# Patient Record
Sex: MALE | Race: WHITE
[De-identification: names, ages, dates, MRNs, and addresses within clinical notes are randomized per-mention and may not be internally consistent; named-entity substitution may affect disease eponyms.]

---

## 2021-08-15 ENCOUNTER — HOSPITAL ENCOUNTER (INPATIENT)
Dept: HOSPITAL 47 - EC | Age: 51
LOS: 1 days | Discharge: HOME | DRG: 177 | End: 2021-08-16
Attending: HOSPITALIST | Admitting: HOSPITALIST
Payer: COMMERCIAL

## 2021-08-15 VITALS — HEART RATE: 88 BPM

## 2021-08-15 DIAGNOSIS — E78.5: ICD-10-CM

## 2021-08-15 DIAGNOSIS — E11.9: ICD-10-CM

## 2021-08-15 DIAGNOSIS — Z86.711: ICD-10-CM

## 2021-08-15 DIAGNOSIS — J96.01: ICD-10-CM

## 2021-08-15 DIAGNOSIS — M48.00: ICD-10-CM

## 2021-08-15 DIAGNOSIS — U07.1: Primary | ICD-10-CM

## 2021-08-15 DIAGNOSIS — G47.33: ICD-10-CM

## 2021-08-15 DIAGNOSIS — Z79.84: ICD-10-CM

## 2021-08-15 DIAGNOSIS — Z79.899: ICD-10-CM

## 2021-08-15 DIAGNOSIS — I48.91: ICD-10-CM

## 2021-08-15 DIAGNOSIS — J12.82: ICD-10-CM

## 2021-08-15 DIAGNOSIS — M21.372: ICD-10-CM

## 2021-08-15 DIAGNOSIS — E66.01: ICD-10-CM

## 2021-08-15 DIAGNOSIS — Z79.01: ICD-10-CM

## 2021-08-15 DIAGNOSIS — Z79.890: ICD-10-CM

## 2021-08-15 DIAGNOSIS — I10: ICD-10-CM

## 2021-08-15 LAB
ALBUMIN SERPL-MCNC: 3.7 G/DL (ref 3.5–5)
ALBUMIN SERPL-MCNC: 4 G/DL (ref 3.5–5)
ALP SERPL-CCNC: 62 U/L (ref 38–126)
ALP SERPL-CCNC: 71 U/L (ref 38–126)
ALT SERPL-CCNC: 47 U/L (ref 4–49)
ALT SERPL-CCNC: 51 U/L (ref 4–49)
ANION GAP SERPL CALC-SCNC: 11 MMOL/L
ANION GAP SERPL CALC-SCNC: 12 MMOL/L
APTT BLD: 26.9 SEC (ref 22–30)
AST SERPL-CCNC: 70 U/L (ref 17–59)
AST SERPL-CCNC: 79 U/L (ref 17–59)
BASOPHILS # BLD AUTO: 0 K/UL (ref 0–0.2)
BASOPHILS NFR BLD AUTO: 1 %
BUN SERPL-SCNC: 20 MG/DL (ref 9–20)
BUN SERPL-SCNC: 21 MG/DL (ref 9–20)
CALCIUM SPEC-MCNC: 8.8 MG/DL (ref 8.4–10.2)
CALCIUM SPEC-MCNC: 9 MG/DL (ref 8.4–10.2)
CHLORIDE SERPL-SCNC: 97 MMOL/L (ref 98–107)
CHLORIDE SERPL-SCNC: 97 MMOL/L (ref 98–107)
CO2 SERPL-SCNC: 24 MMOL/L (ref 22–30)
CO2 SERPL-SCNC: 26 MMOL/L (ref 22–30)
EOSINOPHIL # BLD AUTO: 0 K/UL (ref 0–0.7)
EOSINOPHIL NFR BLD AUTO: 1 %
ERYTHROCYTE [DISTWIDTH] IN BLOOD BY AUTOMATED COUNT: 5.27 M/UL (ref 4.3–5.9)
ERYTHROCYTE [DISTWIDTH] IN BLOOD: 14 % (ref 11.5–15.5)
FERRITIN SERPL-MCNC: 1504.2 NG/ML (ref 22–322)
GLUCOSE BLD-MCNC: 111 MG/DL (ref 75–99)
GLUCOSE BLD-MCNC: 114 MG/DL (ref 75–99)
GLUCOSE BLD-MCNC: 120 MG/DL (ref 75–99)
GLUCOSE BLD-MCNC: 182 MG/DL (ref 75–99)
GLUCOSE SERPL-MCNC: 103 MG/DL (ref 74–99)
GLUCOSE SERPL-MCNC: 106 MG/DL (ref 74–99)
HBA1C MFR BLD: 6.5 % (ref 4–6)
HCT VFR BLD AUTO: 43.2 % (ref 39–53)
HGB BLD-MCNC: 15.5 GM/DL (ref 13–17.5)
INR PPP: 1.1 (ref ?–1.2)
LDH SPEC-CCNC: 971 U/L (ref 313–618)
LYMPHOCYTES # SPEC AUTO: 0.6 K/UL (ref 1–4.8)
LYMPHOCYTES NFR SPEC AUTO: 12 %
MAGNESIUM SPEC-SCNC: 2.2 MG/DL (ref 1.6–2.3)
MCH RBC QN AUTO: 29.4 PG (ref 25–35)
MCHC RBC AUTO-ENTMCNC: 35.8 G/DL (ref 31–37)
MCV RBC AUTO: 82 FL (ref 80–100)
MONOCYTES # BLD AUTO: 0.3 K/UL (ref 0–1)
MONOCYTES NFR BLD AUTO: 5 %
NEUTROPHILS # BLD AUTO: 4.1 K/UL (ref 1.3–7.7)
NEUTROPHILS NFR BLD AUTO: 79 %
PLATELET # BLD AUTO: 200 K/UL (ref 150–450)
POTASSIUM SERPL-SCNC: 3.5 MMOL/L (ref 3.5–5.1)
POTASSIUM SERPL-SCNC: 3.7 MMOL/L (ref 3.5–5.1)
PROT SERPL-MCNC: 6.7 G/DL (ref 6.3–8.2)
PROT SERPL-MCNC: 7.2 G/DL (ref 6.3–8.2)
PT BLD: 11.2 SEC (ref 9–12)
SODIUM SERPL-SCNC: 133 MMOL/L (ref 137–145)
SODIUM SERPL-SCNC: 134 MMOL/L (ref 137–145)
WBC # BLD AUTO: 5.1 K/UL (ref 3.8–10.6)

## 2021-08-15 PROCEDURE — 83880 ASSAY OF NATRIURETIC PEPTIDE: CPT

## 2021-08-15 PROCEDURE — 84484 ASSAY OF TROPONIN QUANT: CPT

## 2021-08-15 PROCEDURE — 83036 HEMOGLOBIN GLYCOSYLATED A1C: CPT

## 2021-08-15 PROCEDURE — 3E0F7SF INTRODUCTION OF OTHER GAS INTO RESPIRATORY TRACT, VIA NATURAL OR ARTIFICIAL OPENING: ICD-10-PCS

## 2021-08-15 PROCEDURE — 82728 ASSAY OF FERRITIN: CPT

## 2021-08-15 PROCEDURE — 85730 THROMBOPLASTIN TIME PARTIAL: CPT

## 2021-08-15 PROCEDURE — 86140 C-REACTIVE PROTEIN: CPT

## 2021-08-15 PROCEDURE — 93005 ELECTROCARDIOGRAM TRACING: CPT

## 2021-08-15 PROCEDURE — 83735 ASSAY OF MAGNESIUM: CPT

## 2021-08-15 PROCEDURE — 83615 LACTATE (LD) (LDH) ENZYME: CPT

## 2021-08-15 PROCEDURE — 85610 PROTHROMBIN TIME: CPT

## 2021-08-15 PROCEDURE — 80053 COMPREHEN METABOLIC PANEL: CPT

## 2021-08-15 PROCEDURE — 85025 COMPLETE CBC W/AUTO DIFF WBC: CPT

## 2021-08-15 PROCEDURE — 85379 FIBRIN DEGRADATION QUANT: CPT

## 2021-08-15 PROCEDURE — 84145 PROCALCITONIN (PCT): CPT

## 2021-08-15 PROCEDURE — 36415 COLL VENOUS BLD VENIPUNCTURE: CPT

## 2021-08-15 PROCEDURE — 71045 X-RAY EXAM CHEST 1 VIEW: CPT

## 2021-08-15 PROCEDURE — 83605 ASSAY OF LACTIC ACID: CPT

## 2021-08-15 RX ADMIN — INSULIN ASPART SCH: 100 INJECTION, SOLUTION INTRAVENOUS; SUBCUTANEOUS at 09:07

## 2021-08-15 RX ADMIN — INSULIN ASPART SCH: 100 INJECTION, SOLUTION INTRAVENOUS; SUBCUTANEOUS at 17:22

## 2021-08-15 RX ADMIN — LEVOTHYROXINE SODIUM SCH MCG: 0.11 TABLET ORAL at 09:28

## 2021-08-15 RX ADMIN — INSULIN ASPART SCH: 100 INJECTION, SOLUTION INTRAVENOUS; SUBCUTANEOUS at 12:34

## 2021-08-15 RX ADMIN — BUDESONIDE AND FORMOTEROL FUMARATE DIHYDRATE SCH: 160; 4.5 AEROSOL RESPIRATORY (INHALATION) at 07:59

## 2021-08-15 RX ADMIN — Medication SCH MG: at 09:12

## 2021-08-15 RX ADMIN — PANTOPRAZOLE SODIUM SCH MG: 40 TABLET, DELAYED RELEASE ORAL at 09:12

## 2021-08-15 RX ADMIN — BISOPROLOL FUMARATE SCH MG: 5 TABLET ORAL at 09:12

## 2021-08-15 RX ADMIN — OXYCODONE HYDROCHLORIDE AND ACETAMINOPHEN SCH MG: 500 TABLET ORAL at 09:12

## 2021-08-15 RX ADMIN — ATORVASTATIN CALCIUM SCH MG: 20 TABLET, FILM COATED ORAL at 09:12

## 2021-08-15 RX ADMIN — BUDESONIDE AND FORMOTEROL FUMARATE DIHYDRATE SCH PUFF: 160; 4.5 AEROSOL RESPIRATORY (INHALATION) at 20:01

## 2021-08-15 RX ADMIN — Medication SCH MCG: at 09:12

## 2021-08-15 RX ADMIN — INSULIN ASPART SCH UNIT: 100 INJECTION, SOLUTION INTRAVENOUS; SUBCUTANEOUS at 20:29

## 2021-08-15 RX ADMIN — APIXABAN SCH MG: 5 TABLET, FILM COATED ORAL at 09:12

## 2021-08-15 RX ADMIN — DOXAZOSIN MESYLATE SCH MG: 4 TABLET ORAL at 20:29

## 2021-08-15 RX ADMIN — APIXABAN SCH MG: 5 TABLET, FILM COATED ORAL at 20:29

## 2021-08-15 RX ADMIN — DOXAZOSIN MESYLATE SCH MG: 4 TABLET ORAL at 09:12

## 2021-08-15 NOTE — P.PN
Progress Note - Text


Progress Note Date: 08/15/21





I reviewed patient's labs from this morning.  There is a slight increase in 

creatinine.  We'll hold his ACE inhibitor, Lasix and spironolactone.  We'll 

monitor patient closely for volume overload.  Patient currently satting well on 

2 L nasal cannula.  Pulmonology patient is out of the window for remdesivir.

## 2021-08-15 NOTE — P.CNPUL
History of Present Illness


Consult date: 08/15/21


Requesting physician: Anthony Dwyer


Reason for consult: dyspnea, cough, hypoxemia, pneumonia, abnormal CXR/CT


Chief complaint: COVID-19 pneumonia


History of present illness: 


 This is a 51-year-old male patient with past medical history of diabetes 

mellitus type 2, hypertension, history of bilateral PEs on Eliquis, presented to

the emergency department on 08/15/2021 for evaluation of shortness of breath, 

palpitations, fever, body aches, intermittent diarrhea and mild nausea.  Patient

was diagnosed with COVID 19.  The onset of symptoms was 12 days ago.  His son 

was diagnosed with COVID 19.  Patient states his symptoms were progressively 

worse and not better.  He has been battling a fever over the past 3-4 days.  He 

feels more short of breath with exertion.  He states he feels like all he wants 

to do is lay in bed.  Patient lives in [facility], but he is a  citizen, 

and owns a vacation property in [place], where he was vacationing.  

Patient did not receive COVID-19 vaccine.  On arrival to the emergency 

department his O2 sat is 92-94%.  His chest x-ray shows some mild pulmonary 

interstitial infiltrates.  Does have history of obstructive sleep apnea for 

which he wears a CPAP.  His lab work showed normal white count of 5.1, 

hemoglobin of 15.5, lymphocyte count of 0.6, d-dimer was 0.76, sodium is 133, 

potassium is 3.7, chloride is 97, CO2 is 24, B1 is 20, creatinine 0.78, lactic 

acid was 1.2, AST was 79, ALT was 51, alkaline phosphatase was 71, troponin was 

0.018, CRP was 4.1, proBNP was 284.  Patient was started on Decadron 6 mg daily,

vitamins C, D, and zinc.  He is already on maintenance dose Eliquis 5 mg daily, 

he is on Ventolin and Symbicort, and he is receiving IV fluids at 75 ML per 

hour.  Have a low-grade fever this morning with a temp of 100.5F, blood 

pressure stable, he is currently on his home CPAP, when he is on nasal cannula 

he is requiring 2 L of oxygen and his pulse ox is 94%.  He denies any chest 

discomfort. 








Review of Systems


All systems: negative


Constitutional: Reports fever, Denies chills


Eyes: denies blurred vision, denies pain


Ears, nose, mouth and throat: Denies headache, Denies sore throat


Cardiovascular: Denies chest pain, Denies shortness of breath


Respiratory: Reports dyspnea, Denies cough


Gastrointestinal: Reports nausea, Denies abdominal pain, Denies diarrhea, Denies

vomiting


Musculoskeletal: Denies myalgias


Integumentary: Denies pruritus, Denies rash


Neurological: Denies numbness, Denies weakness


Psychiatric: Denies anxiety, Denies depression


Endocrine: Denies fatigue, Denies weight change





Past Medical History


Past Medical History: Diabetes Mellitus, Hypertension, Pneumonia


Additional Past Medical History / Comment(s): emboli


History of Any Multi-Drug Resistant Organisms: None Reported


Past Surgical History: Orthopedic Surgery


Past Psychological History: No Psychological Hx Reported


Smoking Status: Never smoker


Past Alcohol Use History: Occasional


Past Drug Use History: None Reported





- Past Family History


  ** Father


Family Medical History: Hyperlipidemia





Medications and Allergies


                                Home Medications











 Medication  Instructions  Recorded  Confirmed  Type


 


Albuterol Sulfate [Ventolin HFA] 2 puff INHALATION RT-Q4H PRN 08/15/21 08/15/21 

History


 


Apixaban [Eliquis] 5 mg PO BID 08/15/21 08/15/21 History


 


Bisoprolol Fumarate [Zebeta] 10 mg PO DAILY 08/15/21 08/15/21 History


 


Candesartan Cilexetil [Atacand] 32 mg PO DAILY 08/15/21 08/15/21 History


 


Empagliflozin/Metformin HCl 1 tab PO BID 08/15/21 08/15/21 History





[Synjardy Xr 25-1,000 mg Tablet]    


 


Fluticasone/Vilanterol [Breo 1 puff INHALATION RT-DAILY 08/15/21 08/15/21 

History





Ellipta 200-25 Mcg INH]    


 


Furosemide [Lasix] 120 mg PO DAILY 08/15/21 08/15/21 History


 


Levothyroxine Sodium [Synthroid] 112 mcg PO DAILY 08/15/21 08/15/21 History


 


RABEprazole SODIUM 20 mg PO DAILY 08/15/21 08/15/21 History


 


Rosuvastatin [Crestor] 10 mg PO DAILY 08/15/21 08/15/21 History


 


Spironolactone 25 mg PO DAILY 08/15/21 08/15/21 History


 


Terazosin HCl 10 mg PO BID 08/15/21 08/15/21 History








                                    Allergies











Allergy/AdvReac Type Severity Reaction Status Date / Time


 


No Known Allergies Allergy   Verified 08/15/21 07:27














Physical Exam


Vitals: 


                                   Vital Signs











  Temp Pulse Resp BP Pulse Ox


 


 08/15/21 09:00  98.0 F  90  18  121/81  96


 


 08/15/21 06:25  99 F  88  20  106/65  95


 


 08/15/21 04:17  100.5 F H  90  20  107/80  94 L


 


 08/15/21 03:22   88  20  110/78  98


 


 08/15/21 01:21  97.7 F  102 H  22  121/82  94 L








                                Intake and Output











 08/14/21 08/15/21 08/15/21





 22:59 06:59 14:59


 


Other:   


 


  Weight  145.15 kg 











 GENERAL EXAM: Alert, very pleasant, 51-year-old white male, on CPAP, 

comfortable in no apparent distress.


HEAD: Normocephalic/atraumatic.


EYES: Normal reaction of pupils, equal size.  Conjunctiva pink, sclera white.


NOSE: Clear with pink turbinates.


THROAT: No erythema or exudates.


NECK: No masses, no JVD, no thyroid enlargement, no adenopathy.


CHEST: No chest wall deformity.  Symmetrical expansion. 


LUNGS: Equal air entry with no crackles, wheeze, rhonchi or dullness.


CVS: Regular rate and rhythm, normal S1 and S2, no gallops, no murmurs, no rubs


ABDOMEN: Soft, nontender.  No hepatosplenomegaly, normal bowel sounds, no 

guarding or rigidity.


EXTREMITIES: No clubbing, no edema, no cyanosis, 2+ pulses and upper and lower 

extremities.


MUSCULOSKELETAL: Muscle strength and tone normal.


SPINE: No scoliosis or deformity


SKIN: No rashes


CENTRAL NERVOUS SYSTEM: Alert and oriented -3.  No focal deficits, tone is 

normal in all 4 extremities.


PSYCHIATRIC: Alert and oriented -3.  Appropriate affect.  Intact judgment and 

insight.











Results





- Laboratory Findings


CBC and BMP: 


                                 08/15/21 01:50





                                 08/15/21 05:50


PT/INR, D-dimer











PT  11.2 sec (9.0-12.0)   08/15/21  01:50    


 


INR  1.1  (<1.2)   08/15/21  01:50    


 


D-Dimer  0.76 mg/L FEU (<0.60)  H  08/15/21  09:06    








Abnormal lab findings: 


                                  Abnormal Labs











  08/15/21 08/15/21 08/15/21





  01:50 01:50 05:50


 


Lymphocytes #  0.6 L  


 


D-Dimer   


 


Sodium   133 L  134 L


 


Chloride   97 L  97 L


 


BUN    21 H


 


Glucose   106 H  103 H


 


POC Glucose (mg/dL)   


 


AST   79 H  70 H


 


ALT   51 H 


 


Lactate Dehydrogenase   971 H 


 


C-Reactive Protein   4.1 H 














  08/15/21 08/15/21





  09:06 09:06


 


Lymphocytes #  


 


D-Dimer  0.76 H 


 


Sodium  


 


Chloride  


 


BUN  


 


Glucose  


 


POC Glucose (mg/dL)   120 H


 


AST  


 


ALT  


 


Lactate Dehydrogenase  


 


C-Reactive Protein  














- Diagnostic Findings


Chest x-ray: report reviewed, image reviewed


Additional studies: 


 EKG reviewed








Assessment and Plan


Plan: 


 Assessment:





#1.  Acute hypoxic respiratory failure related to COVID-19 pneumonia, onset of 

symptoms was 12 days prior to presentation to the emergency department.  Out of 

the window for Remdesivir.  Currently on Decadron, on home dose Eliquis, and 

vitamin C, D and zinc.  Patient is not vaccinated for COVID-19





#2.  History of diabetes mellitus type 2





#3.  Morbid obesity with BMI of 43.4 kg/m





#4.  History of bilateral PEs on Eliquis





#5.  Obstructive sleep apnea on CPAP





#6.  Spinal stenosis with left foot drop





#7.  Hypertension





#8.  Lifetime nonsmoker





Plan:





Continue Decadron 6 mg daily


Continue vitamins


Continue home dose Eliquis


Patient is using his home CPAP


Patient is out of the window for Remdesivir


We'll continue to follow his clinical course


Continue supportive treatment, continue monitoring for worsening dyspnea or 

hypoxia





I performed a history & physical examination of the patient and discussed their 

management with my nurse practitioner, Donya Feliciano.  I reviewed the nurse pr

actitioner's note and agree with the documented findings and plan of care.  Lung

 sounds are positive for diminished breath sounbds throughout the lung fields.  

The findings and the impression was discussed with the patient.  I attest to the

 documentation by the nurse practitioner. 





Time with Patient: Greater than 30

## 2021-08-15 NOTE — ED
General Adult HPI





- General


Chief complaint: Upper Respiratory Infection


Stated complaint: SOB


Time Seen by Provider: 08/15/21 01:32


Source: patient, EMS


Mode of arrival: EMS


Limitations: no limitations





- History of Present Illness


Initial comments: 





Patient is a 51-year-old male with history of diabetes, hypertension, bilateral 

PEs, presenting to the emergency Department with complaints of worsening Covid 

infection.  He states he is been having cold symptoms for the last 11 days.  He 

states his sons also had an infection.  He feels like he is getting worse and 

not better.  He has been battling fevers over the past 3-4 days.  He states he 

is feeling more shortness of breath with exertion, feels like his heart is 

racing at times.  He does have history of bilateral PEs in 2018, he is on 

eliquis.  He does admit to some mild nausea, some intermittent diarrhea as well.

 He states he feels like all he wants to do is lay in bed.  His last dose of Ty

lenol was a few hours ago.  He has no history of asthma COPD.  Denies any 

abdominal pain, no chest pain at this time.  He has no further complaints.  Upon

arrival to the ER, he is 92-94% on room air, slightly tachycardia at 102, rest 

of vitals normal.





- Related Data


                                Home Medications











 Medication  Instructions  Recorded  Confirmed


 


Albuterol Sulfate [Ventolin HFA] 2 puff INHALATION RT-Q4H PRN 08/15/21 08/15/21


 


Apixaban [Eliquis] 5 mg PO BID 08/15/21 08/15/21


 


Bisoprolol Fumarate [Zebeta] 10 mg PO DAILY 08/15/21 08/15/21


 


Candesartan Cilexetil [Atacand] 32 mg PO DAILY 08/15/21 08/15/21


 


Empagliflozin/Metformin HCl 1 tab PO BID 08/15/21 08/15/21





[Synjardy Xr 25-1,000 mg Tablet]   


 


Fluticasone/Vilanterol [Breo 1 puff INHALATION RT-DAILY 08/15/21 08/15/21





Ellipta 200-25 Mcg INH]   


 


Furosemide [Lasix] 120 mg PO DAILY 08/15/21 08/15/21


 


Levothyroxine Sodium [Synthroid] 112 mcg PO DAILY 08/15/21 08/15/21


 


RABEprazole SODIUM 20 mg PO DAILY 08/15/21 08/15/21


 


Rosuvastatin [Crestor] 10 mg PO DAILY 08/15/21 08/15/21


 


Spironolactone 25 mg PO DAILY 08/15/21 08/15/21


 


Terazosin HCl 10 mg PO BID 08/15/21 08/15/21











                                    Allergies











Allergy/AdvReac Type Severity Reaction Status Date / Time


 


No Known Allergies Allergy   Verified 08/15/21 07:27














Review of Systems


ROS Statement: 


Those systems with pertinent positive or pertinent negative responses have been 

documented in the HPI.





ROS Other: All systems not noted in ROS Statement are negative.





Past Medical History


Past Medical History: Diabetes Mellitus, Hypertension, Pneumonia


Additional Past Medical History / Comment(s): emboli


History of Any Multi-Drug Resistant Organisms: None Reported


Past Surgical History: Orthopedic Surgery


Past Psychological History: No Psychological Hx Reported


Smoking Status: Never smoker


Past Alcohol Use History: Occasional


Past Drug Use History: None Reported





- Past Family History


  ** Father


Family Medical History: Hyperlipidemia





General Exam





- General Exam Comments


Initial Comments: 





GENERAL: 


Patient is well-developed and well-nourished.  Patient is nontoxic and in no 

acute distress.





HEAD: 


Atraumatic, normocephalic.





EYES:


Pupils equal round and reactive to light, extraocular movements intact, sclera 

anicteric, conjunctiva are normal.  Eyelids were unremarkable.





ENT: 


TMs normal, nares patent, oropharynx clear without exudates.  Moist mucous 

membranes.





NECK: 


Normal range of motion, supple without lymphadenopathy or JVD.





LUNGS:


Unlabored respirations.  Breath sounds clear to auscultation bilaterally and 

equal.  No wheezes rales or rhonchi.





HEART:


Tachycardia rate and rhythm without murmurs, rubs or gallops.





ABDOMEN: 


Soft, nontender, normoactive bowel sounds.  No guarding, no rebound.  No masses 

appreciated.





: Deferred 





MUSCULOSKELETAL: 


Normal extremities with adequate strength and normal range of motion, no pitting

 or edema.  No clubbing or cyanosis.





NEUROLOGICAL: 


Patient is alert and oriented x 3.  Motor and sensory are also intact.  Cranial 

nerves II through XII grossly intact.  Symmetrical smile.  Normal speech, normal

 gait.   





PSYCH:


Normal mood, normal affect.





SKIN:


 Warm, Dry, normal turgor, no rashes or lesions noted.


Limitations: no limitations





Course


                                   Vital Signs











  08/15/21 08/15/21 08/15/21





  01:21 03:22 04:17


 


Temperature 97.7 F  100.5 F H


 


Pulse Rate 102 H 88 90


 


Respiratory 22 20 20





Rate   


 


Blood Pressure 121/82 110/78 107/80


 


O2 Sat by Pulse 94 L 98 94 L





Oximetry   














  08/15/21 08/15/21 08/15/21





  06:25 09:00 12:47


 


Temperature 99 F 98.0 F 98.7 F


 


Pulse Rate 88 90 87


 


Respiratory 20 18 20





Rate   


 


Blood Pressure 106/65 121/81 116/80


 


O2 Sat by Pulse 95 96 94 L





Oximetry   














EKG Findings





- EKG Comments:


EKG Findings:: A. fib with PVCs, no signs of acute ST segment elevation.  

Ventricular rate 98, QRS duration 94, .





Medical Decision Making





- Medical Decision Making





Patient is a 51-year-old male with history of diabetes, hypertension, PEs, 

presenting for worsening shortness of breath over the last few days.  He is 

currently on day 11 of having Covid.  Oximetry on room air satting at 92-94%, is

 currently on 2 L.  EKG today shows A. fib, he has no history of this.  He 

states he recently had a full cardiac workup with no other abnormalities.  Labs 

are showing a normal white count, lactic acid normal, LDH is 971, BNP is 284.  

X-ray as showing a bilateral mild pulmonary interstitial infiltrates.


Patient will be admitted for new-onset A. fib, Covid hypoxia.  Patient's 

accepted by Dr. Dwyer, consult to pulmonology.  Case discussed with Dr. Patel. 





- Lab Data


Result diagrams: 


                                 08/15/21 01:50





                                 08/15/21 05:50


                                   Lab Results











  08/15/21 08/15/21 08/15/21 Range/Units





  01:50 01:50 01:50 


 


WBC  5.1    (3.8-10.6)  k/uL


 


RBC  5.27    (4.30-5.90)  m/uL


 


Hgb  15.5    (13.0-17.5)  gm/dL


 


Hct  43.2    (39.0-53.0)  %


 


MCV  82.0    (80.0-100.0)  fL


 


MCH  29.4    (25.0-35.0)  pg


 


MCHC  35.8    (31.0-37.0)  g/dL


 


RDW  14.0    (11.5-15.5)  %


 


Plt Count  200    (150-450)  k/uL


 


MPV  7.8    


 


Neutrophils %  79    %


 


Lymphocytes %  12    %


 


Monocytes %  5    %


 


Eosinophils %  1    %


 


Basophils %  1    %


 


Neutrophils #  4.1    (1.3-7.7)  k/uL


 


Lymphocytes #  0.6 L    (1.0-4.8)  k/uL


 


Monocytes #  0.3    (0-1.0)  k/uL


 


Eosinophils #  0.0    (0-0.7)  k/uL


 


Basophils #  0.0    (0-0.2)  k/uL


 


Hyperchromasia  Slight    


 


PT   11.2   (9.0-12.0)  sec


 


INR   1.1   (<1.2)  


 


APTT   26.9   (22.0-30.0)  sec


 


Sodium    133 L  (137-145)  mmol/L


 


Potassium    3.7  (3.5-5.1)  mmol/L


 


Chloride    97 L  ()  mmol/L


 


Carbon Dioxide    24  (22-30)  mmol/L


 


Anion Gap    12  mmol/L


 


BUN    20  (9-20)  mg/dL


 


Creatinine    0.78  (0.66-1.25)  mg/dL


 


Est GFR (CKD-EPI)AfAm    >90  (>60 ml/min/1.73 sqM)  


 


Est GFR (CKD-EPI)NonAf    >90  (>60 ml/min/1.73 sqM)  


 


Glucose    106 H  (74-99)  mg/dL


 


Plasma Lactic Acid Jason     (0.7-2.0)  mmol/L


 


Calcium    9.0  (8.4-10.2)  mg/dL


 


Magnesium    2.2  (1.6-2.3)  mg/dL


 


Total Bilirubin    1.2  (0.2-1.3)  mg/dL


 


AST    79 H  (17-59)  U/L


 


ALT    51 H  (4-49)  U/L


 


Alkaline Phosphatase    71  ()  U/L


 


Lactate Dehydrogenase    971 H  (313-618)  U/L


 


Troponin I     (0.000-0.034)  ng/mL


 


C-Reactive Protein    4.1 H  (<1.0)  mg/dL


 


NT-Pro-B Natriuret Pep     pg/mL


 


Total Protein    7.2  (6.3-8.2)  g/dL


 


Albumin    4.0  (3.5-5.0)  g/dL














  08/15/21 08/15/21 08/15/21 Range/Units





  01:50 01:50 02:51 


 


WBC     (3.8-10.6)  k/uL


 


RBC     (4.30-5.90)  m/uL


 


Hgb     (13.0-17.5)  gm/dL


 


Hct     (39.0-53.0)  %


 


MCV     (80.0-100.0)  fL


 


MCH     (25.0-35.0)  pg


 


MCHC     (31.0-37.0)  g/dL


 


RDW     (11.5-15.5)  %


 


Plt Count     (150-450)  k/uL


 


MPV     


 


Neutrophils %     %


 


Lymphocytes %     %


 


Monocytes %     %


 


Eosinophils %     %


 


Basophils %     %


 


Neutrophils #     (1.3-7.7)  k/uL


 


Lymphocytes #     (1.0-4.8)  k/uL


 


Monocytes #     (0-1.0)  k/uL


 


Eosinophils #     (0-0.7)  k/uL


 


Basophils #     (0-0.2)  k/uL


 


Hyperchromasia     


 


PT     (9.0-12.0)  sec


 


INR     (<1.2)  


 


APTT     (22.0-30.0)  sec


 


Sodium     (137-145)  mmol/L


 


Potassium     (3.5-5.1)  mmol/L


 


Chloride     ()  mmol/L


 


Carbon Dioxide     (22-30)  mmol/L


 


Anion Gap     mmol/L


 


BUN     (9-20)  mg/dL


 


Creatinine     (0.66-1.25)  mg/dL


 


Est GFR (CKD-EPI)AfAm     (>60 ml/min/1.73 sqM)  


 


Est GFR (CKD-EPI)NonAf     (>60 ml/min/1.73 sqM)  


 


Glucose     (74-99)  mg/dL


 


Plasma Lactic Acid Jason  1.2    (0.7-2.0)  mmol/L


 


Calcium     (8.4-10.2)  mg/dL


 


Magnesium     (1.6-2.3)  mg/dL


 


Total Bilirubin     (0.2-1.3)  mg/dL


 


AST     (17-59)  U/L


 


ALT     (4-49)  U/L


 


Alkaline Phosphatase     ()  U/L


 


Lactate Dehydrogenase     (313-618)  U/L


 


Troponin I    0.018  (0.000-0.034)  ng/mL


 


C-Reactive Protein     (<1.0)  mg/dL


 


NT-Pro-B Natriuret Pep   284   pg/mL


 


Total Protein     (6.3-8.2)  g/dL


 


Albumin     (3.5-5.0)  g/dL














Disposition


Clinical Impression: 


 COVID-19, Hypoxia, New onset a-fib





Disposition: ADMITTED AS IP TO THIS HOSP


Condition: Stable


Decision Date: 08/15/21


Decision Time: 02:56

## 2021-08-15 NOTE — XR
EXAMINATION TYPE: XR chest 1V portable

 

DATE OF EXAM: 8/15/2021

 

COMPARISON: NONE

 

HISTORY: Pneumonia

 

TECHNIQUE: 2 views

 

FINDINGS: Portable exam shows some coarse predominantly interstitial infiltrate in both lungs. Exam l
imited by patient's size. There is no definite heart failure. I see no pleural effusion.

 

IMPRESSION: There is some mild pulmonary interstitial infiltrates.

## 2021-08-15 NOTE — P.HPIM
History of Present Illness


H&P Date: 08/15/21





The patient is a 51-year-old male with a PMH of bilateral PEs on Eliquis, 

obstructive sleep apnea on CPAP, spinal stenosis with left foot drop, type II 

DM, and hypertension who presented to the emergency room with complaints of 

worsening COVID-19 infection.  Patient reports that his symptoms started at 11-

12 days ago and that him and his son were diagnosed roughly 7-8 days ago at a 

local [facility].  The patient is on vaccinated.  He notes that his symptoms of 

decreased exercise tolerance, palpitations, myalgias, malaise, headaches and 

hypoxia gradually been worsening.  The patient has a pulse oximeter at home and 

notes that his resting SpO2 levels have gone as low as 90%.  He also states int

ermittent fevers over the past 3-4 days.  He further reports nausea and 

diarrhea.  He denied chest pain, abdominal pain, weakness, numbness, tingling.  

In the emergency room, chest x-ray showed findings consistent with COVID-19 

pneumonia.  Laboratory evaluation was remarkable for sodium of 133, chloride 97,

glucose 106, AST 79, ALT 51, and . 





Review of systems:


Pertinent positives and negatives as discussed in HPI, a complete review of 

systems was performed and all other systems are negative.





Physical examination:


General: non toxic, no distress, appears older than stated age, morbidly obese


Derm: no unusual rashes/lesions no unusual ecchymoses, warm, dry


Head: atraumatic, normocephalic, symmetric


Eyes: EOMI, no lid lag, anicteric sclera, pupils equal round reactive to light


ENT: Nose and ears atraumatic, no thrush,  no pharyngeal erythema


Neck: No thyromegaly, no cervical lymphadenopathy, trachea midline, supple


Mouth: no lip lesion, mucus membranes moist


Cardiovascular: S1S2 reg, no murmur, positive posterior tibial pulse bilateral, 

no edema, capillary refill less than 2 seconds


Lungs: Diffuse bilateral rhonchi, no wheezing or rales appreciated , no 

accessory muscle use


Abdominal: soft,  nontender to palpation, no guarding, no appreciable 

organomegaly, normal bowel sounds


Ext: no gross muscle atrophy,  muscle strength 5 out of 5 in all 4 extremities 

grossly septic left lower extremity ankle strength 2 out of 5, no contractures, 

left ankle brace in place


Neuro:  CN II-XI grossly intact, light touch intact all 4 extremities, finger to

nose within normal limits,


Psych: Alert, oriented, appropriate affect 





Assessment/plan





COVID-19 pneumonitis with acute hypoxic respiratory failure


-Supplemental oxygen


-Dexamethasone


-Vitamin C, vitamin D, zinc


-Pulmonary consult





Abnormal LFTs


-Likely due to COVID-19


-Monitor for now





Chronic conditions: Obstructive sleep apnea, bilateral PEs, type II DM, 

hypertension


-Continue with home CPAP


-Lispro insulin sliding scale, blood glucose monitoring


-Check A1c


-Continue with home Eliquis





DVT prophylaxis


-Eliquis





The patient is admitted with an anticipated greater than 2 midnight stay for 

evaluation of COVID-19 


CODE STATUS: Full Code


Discussed with: Patient


Anticipated discharge date: 2-3 days


Anticipated discharge place: Home








Past Medical History


Past Medical History: Diabetes Mellitus, Hypertension, Pneumonia


Additional Past Medical History / Comment(s): emboli


History of Any Multi-Drug Resistant Organisms: None Reported


Past Surgical History: Orthopedic Surgery


Past Psychological History: No Psychological Hx Reported


Smoking Status: Never smoker


Past Alcohol Use History: Occasional


Past Drug Use History: None Reported





- Past Family History


  ** Father


Family Medical History: Hyperlipidemia





Medications and Allergies


                                    Allergies











Allergy/AdvReac Type Severity Reaction Status Date / Time


 


No Known Allergies Allergy   Verified 08/15/21 01:28














Physical Exam


Vitals: 


                                   Vital Signs











  Temp Pulse Resp BP Pulse Ox


 


 08/15/21 01:21  97.7 F  102 H  22  121/82  94 L








                                Intake and Output











 08/14/21 08/14/21 08/15/21





 14:59 22:59 06:59


 


Other:   


 


  Weight   145.15 kg














Results


CBC & Chem 7: 


                                 08/15/21 01:50





                                 08/15/21 01:50


Labs: 


                  Abnormal Lab Results - Last 24 Hours (Table)











  08/15/21 08/15/21 Range/Units





  01:50 01:50 


 


Lymphocytes #  0.6 L   (1.0-4.8)  k/uL


 


Sodium   133 L  (137-145)  mmol/L


 


Chloride   97 L  ()  mmol/L


 


Glucose   106 H  (74-99)  mg/dL


 


AST   79 H  (17-59)  U/L


 


ALT   51 H  (4-49)  U/L


 


Lactate Dehydrogenase   971 H  (313-618)  U/L

## 2021-08-16 VITALS — SYSTOLIC BLOOD PRESSURE: 120 MMHG | RESPIRATION RATE: 17 BRPM | TEMPERATURE: 97.4 F | DIASTOLIC BLOOD PRESSURE: 90 MMHG

## 2021-08-16 LAB
ALBUMIN SERPL-MCNC: 3.7 G/DL (ref 3.5–5)
ALBUMIN/GLOB SERPL: 1.2 {RATIO}
ALP SERPL-CCNC: 60 U/L (ref 38–126)
ALT SERPL-CCNC: 47 U/L (ref 4–49)
ANION GAP SERPL CALC-SCNC: 8 MMOL/L
AST SERPL-CCNC: 51 U/L (ref 17–59)
BUN SERPL-SCNC: 23 MG/DL (ref 9–20)
CALCIUM SPEC-MCNC: 9.3 MG/DL (ref 8.4–10.2)
CHLORIDE SERPL-SCNC: 100 MMOL/L (ref 98–107)
CO2 SERPL-SCNC: 28 MMOL/L (ref 22–30)
GLOBULIN SER CALC-MCNC: 3 G/DL
GLUCOSE BLD-MCNC: 134 MG/DL (ref 75–99)
GLUCOSE BLD-MCNC: 160 MG/DL (ref 75–99)
GLUCOSE SERPL-MCNC: 130 MG/DL (ref 74–99)
POTASSIUM SERPL-SCNC: 3.8 MMOL/L (ref 3.5–5.1)
PROT SERPL-MCNC: 6.7 G/DL (ref 6.3–8.2)
SODIUM SERPL-SCNC: 136 MMOL/L (ref 137–145)

## 2021-08-16 RX ADMIN — DOXAZOSIN MESYLATE SCH MG: 4 TABLET ORAL at 08:27

## 2021-08-16 RX ADMIN — OXYCODONE HYDROCHLORIDE AND ACETAMINOPHEN SCH MG: 500 TABLET ORAL at 08:27

## 2021-08-16 RX ADMIN — ATORVASTATIN CALCIUM SCH MG: 20 TABLET, FILM COATED ORAL at 08:32

## 2021-08-16 RX ADMIN — LEVOTHYROXINE SODIUM SCH MCG: 0.11 TABLET ORAL at 06:21

## 2021-08-16 RX ADMIN — PANTOPRAZOLE SODIUM SCH MG: 40 TABLET, DELAYED RELEASE ORAL at 08:32

## 2021-08-16 RX ADMIN — BISOPROLOL FUMARATE SCH MG: 5 TABLET ORAL at 08:35

## 2021-08-16 RX ADMIN — Medication SCH MCG: at 08:28

## 2021-08-16 RX ADMIN — Medication SCH MG: at 09:26

## 2021-08-16 RX ADMIN — BUDESONIDE AND FORMOTEROL FUMARATE DIHYDRATE SCH PUFF: 160; 4.5 AEROSOL RESPIRATORY (INHALATION) at 08:52

## 2021-08-16 RX ADMIN — INSULIN ASPART SCH UNIT: 100 INJECTION, SOLUTION INTRAVENOUS; SUBCUTANEOUS at 08:32

## 2021-08-16 RX ADMIN — APIXABAN SCH MG: 5 TABLET, FILM COATED ORAL at 08:27

## 2021-08-16 NOTE — P.PN
Subjective


Progress Note Date: 08/16/21


Principal diagnosis: 


 COVID-19 pneumonia





This is a 51-year-old male patient with past medical history of diabetes 

mellitus type 2, hypertension, history of bilateral PEs on Eliquis, presented to

the emergency department on 08/15/2021 for evaluation of shortness of breath, 

palpitations, fever, body aches, intermittent diarrhea and mild nausea.  Patient

was diagnosed with COVID 19.  The onset of symptoms was 12 days ago.  His son 

was diagnosed with COVID 19.  Patient states his symptoms were progressively 

worse and not better.  He has been battling a fever over the past 3-4 days.  He 

feels more short of breath with exertion.  He states he feels like all he wants 

to do is lay in bed.  Patient lives in [facility], but he is a [place] citizen, 

and owns a vacation property in [facility], where he was vacationing.  

Patient did not receive COVID-19 vaccine.  On arrival to the emergency 

department his O2 sat is 92-94%.  His chest x-ray shows some mild pulmonary 

interstitial infiltrates.  Does have history of obstructive sleep apnea for 

which he wears a CPAP.  His lab work showed normal white count of 5.1, 

hemoglobin of 15.5, lymphocyte count of 0.6, d-dimer was 0.76, sodium is 133, 

potassium is 3.7, chloride is 97, CO2 is 24, B1 is 20, creatinine 0.78, lactic 

acid was 1.2, AST was 79, ALT was 51, alkaline phosphatase was 71, troponin was 

0.018, CRP was 4.1, proBNP was 284.  Patient was started on Decadron 6 mg daily,

vitamins C, D, and zinc.  He is already on maintenance dose Eliquis 5 mg daily, 

he is on Ventolin and Symbicort, and he is receiving IV fluids at 75 ML per 

hour.  Have a low-grade fever this morning with a temp of 100.5F, blood 

pressure stable, he is currently on his home CPAP, when he is on nasal cannula 

he is requiring 2 L of oxygen and his pulse ox is 94%.  He denies any chest 

discomfort. 





On 08/16/2021 patient seen in follow-up on medical surgical floor.  He states he

is feeling much better today, breathing easier, he is currently on room air, 

with a pulse ox of 94-98%, his been afebrile, hemodynamically has been stable, 

he has been wearing his home CPAP at has home settings at bedtime.  He thinks it

has been helping him, occasional cough, no phlegm production, no chest 

discomfort.  He remains on Decadron 6 mg daily, COVID-19 vitamins, he was out of

the window for Remdesivir.  Today's labs have been reviewed, BNP was done, 

showing sodium 136, the rest of electrolytes were within normal limits, B1 is 23

creatinine 0.92.  He had no nausea vomiting.  Abdomen is soft, he is tolerating 

oral diet.  He is on oral anticoagulation and form of Eliquis for history of 

bilateral PEs.  No acute events overnight.











Objective





- Vital Signs


Vital signs: 


                                   Vital Signs











Temp  97.4 F L  08/16/21 08:00


 


Pulse  88   08/16/21 08:00


 


Resp  17   08/16/21 08:00


 


BP  120/90   08/16/21 08:00


 


Pulse Ox  94 L  08/16/21 10:29








                                 Intake & Output











 08/15/21 08/16/21 08/16/21





 18:59 06:59 18:59


 


Weight 145.15 kg  


 


Other:   


 


  Voiding Method  Toilet Toilet


 


  # Voids 1 1 














- Exam


GENERAL EXAM: Alert, very pleasant, 51-year-old white male, room air with a 

pulse ox of 94-98% comfortable in no apparent distress.


HEAD: Normocephalic/atraumatic.


EYES: Normal reaction of pupils, equal size.  Conjunctiva pink, sclera white.


NOSE: Clear with pink turbinates.


THROAT: No erythema or exudates.


NECK: No masses, no JVD, no thyroid enlargement, no adenopathy.


CHEST: No chest wall deformity.  Symmetrical expansion. 


LUNGS: Equal air entry with no crackles, wheeze, rhonchi or dullness.


CVS: Regular rate and rhythm, normal S1 and S2, no gallops, no murmurs, no rubs


ABDOMEN: Soft, nontender.  No hepatosplenomegaly, normal bowel sounds, no 

guarding or rigidity.


EXTREMITIES: No clubbing, no edema, no cyanosis, 2+ pulses and upper and lower 

extremities.


MUSCULOSKELETAL: Muscle strength and tone normal.


SPINE: No scoliosis or deformity


SKIN: No rashes


CENTRAL NERVOUS SYSTEM: Alert and oriented -3.  No focal deficits, tone is 

normal in all 4 extremities.


PSYCHIATRIC: Alert and oriented -3.  Appropriate affect.  Intact judgment and 

insight.








- Labs


CBC & Chem 7: 


                                 08/15/21 01:50





                                 08/16/21 05:41


Labs: 


                  Abnormal Lab Results - Last 24 Hours (Table)











  08/15/21 08/15/21 08/15/21 Range/Units





  05:50 17:18 20:19 


 


Sodium     (137-145)  mmol/L


 


BUN     (9-20)  mg/dL


 


Glucose     (74-99)  mg/dL


 


POC Glucose (mg/dL)   111 H  182 H  (75-99)  mg/dL


 


Hemoglobin A1c  6.5 H    (4.0-6.0)  %














  08/16/21 08/16/21 08/16/21 Range/Units





  05:41 08:17 13:11 


 


Sodium  136 L    (137-145)  mmol/L


 


BUN  23 H    (9-20)  mg/dL


 


Glucose  130 H    (74-99)  mg/dL


 


POC Glucose (mg/dL)   134 H  160 H  (75-99)  mg/dL


 


Hemoglobin A1c     (4.0-6.0)  %














Assessment and Plan


Plan: 


 Assessment:





#1.  Acute hypoxic respiratory failure related to COVID-19 pneumonia, onset of 

symptoms was 12 days prior to presentation to the emergency department.  Out of 

the window for Remdesivir.  Currently on Decadron, on home dose Eliquis, and vit

amin C, D and zinc.  Patient is not vaccinated for COVID-19





#2.  History of diabetes mellitus type 2





#3.  Morbid obesity with BMI of 43.4 kg/m





#4.  History of bilateral PEs on Eliquis





#5.  Obstructive sleep apnea on CPAP





#6.  Spinal stenosis with left foot drop





#7.  Hypertension





#8.  Lifetime nonsmoker





Plan:





Patient is currently on room air


Maintaining O2 saturations 94-98%


No worsening dyspnea or hypoxia


He was outside the window for Remdesivir


Vital signs have been stable


Increase activity as tolerated


He stable for discharge from pulmonary perspective


He was instructed to come back for reevaluation with worsening of shortness of 

breath or worsening of hypoxia


He will be sent home on oral Decadron, COVID-19 vitamins, and he is to continue 

oral anticoagulation for previous history of pulmonary emboli.


Can resume his maintenance inhalers


Outpatient follow-up with Dr. Augustin in the office in 7-10 days





I performed a history & physical examination of the patient and discussed their 

management with my nurse practitioner, Donya Feliciano.  I reviewed the nurse 

practitioner's note and agree with the documented findings and plan of care.  

Lung sounds are positive for diminished breath sounbds throughout the lung 

fields.  The findings and the impression was discussed with the patient.  I 

attest to the documentation by the nurse practitioner. 





Time with Patient: Less than 30

## 2021-08-16 NOTE — P.DS
Providers


Date of admission: 


08/15/21 04:24





Expected date of discharge: 08/16/21


Attending physician: 


Anthony Dwyer MD





Consults: 





                                        





08/15/21 02:50


Consult Physician Urgent 


   Consulting Provider: Luana Augustin


   Consult Reason/Comments: COVID


   Do you want consulting provider notified?: Yes











Primary care physician: 


Stated None





Hospital Course: 





Discharge Diagnosis:





COVID-19 pneumonitis





Acute respiratory failure with hypoxia, resolved





Elevated LFTs, resolved





Obstructive sleep apnea CPAP dependent nightly





History of bilateral pulmonary emboli on anticoagulation with Eliquis





Type II non-insulin-dependent diabetes mellitus





Hypertension





Hyperlipidemia





Hospital Course: 





Patient is a 51-year-old male with a past medical history of atrial fibrillation

and bilateral PEs currently on anticoagulation with Eliquis, DAYSI CPAP dependent 

nightly, type II non-insulin-dependent diabetes mellitus, hypertension, and 

hyperlipidemia.  He presented to the hospital on 8/15/21 with a chief complaint 

of shortness of breath, fatigue, myalgias, and headache status post reportedly 

being diagnosed with Covid 19 virus infection on 8/19/21 at [facility].  Patient was admitted with acute respiratory failure with hypoxia 

secondary to Covid 19 pneumonitis and was started on dexamethasone, zinc, cate

min D, vitamin C, and melatonin.  A chest x-ray was completed revealing mild 

pulmonary interstitial infiltrates.  EKG completed showing atrial fibrillation 

at 98 bpm.  Consultation was placed to pulmonologist in which patient was seen 

and fully evaluated by.  Patient's condition improved, he is no longer requiring

oxygen supplementation.  Resting oxygen saturations on room air is 98% and 

ambulatory pulse ox 93-94% on room air.  Patient reports feeling much better 

then he did upon arrival.  Patient is stable for discharge home at this time.  

He was instructed to to follow-up with PCP in 1-2 days and pulmonologist in 1 

week.  Patient being discharged home on Decadron, melatonin, zinc, vitamin C, 

and vitamin D.  Patient was also provided with a refill for his rescue inhaler 

and encouraged to continue use of incentive spirometry upon discharge.  Patient 

is stable for discharge home at this time.








Patient seen and examined at bedside.  He was resting comfortably sitting up on 

edge of bed watching movie on iPAD.  He was on room air with respirations even, 

regular, and unlabored.  Pulse ox was 98% at rest.  Patient showing no signs of 

acute distress at this time.  He reports feeling much better than he felt upon 

arrival.  He's been afebrile  >24 hours vital signs have been stable.  Patient 

denies having any headache, lightheadedness, dizziness, chest pain, 

palpitations, or feeling short of breath.





Vital signs reviewed and stable. 


General: Nontoxic, no distress and appears stated age.  


Derm: Skin warm and dry, normal coloration for ethnicity.


Head: Atraumatic, normocephalic and symmetric.  


Eyes: EOMs intact, no lid lag, and anicteric sclera


Mouth: no lip lesions, mucus membranes moist


Cardiovascular: regular rate and rhythm with normal S1S2, no murmur, positive 

posterior tibial pulses bilaterally, and cap refill < 2 seconds.  


Lungs: Respirations even, regular, and unlabored on room air. Lungs CTA 

bilaterally, no rhonchi, no rales, no wheezing, and no accessory muscle usage. 


Abdominal: soft, nontender to palpation, no guarding, no appreciable 

organomegaly


Ext: ROM intact. No gross muscle atrophy, no edema, no contractures


Neuro: Speech clear, face symmetrical and CN II-XII grossly intact with no noted

focal neuro deficits


Psych: Alert and oriented to person, place, time, and situation. Appropriate and

pleasant affect. 








A total of 45 minutes of time were spent preparing this complex discharge 

summary.





.


Patient Condition at Discharge: Stable





Plan - Discharge Summary


Discharge Rx Participant: No


New Discharge Prescriptions: 


New


   Zinc Sulfate [Orazinc] 220 mg PO DAILY 30 Days #30 cap


   dexAMETHasone ORAL [Hexadrol] 6 mg PO DAILY 7 Days #21 tab


   Melatonin 5 mg PO HS 30 Days #30 tablet


   Ascorbic Acid [Vitamin C] 1,000 mg PO DAILY 30 Days #30 tab


   Cholecalciferol [Vitamin D3 (25 Mcg = 1000 Iu)] 125 mcg PO DAILY 30 Days #30 

tablet





Continue


   RABEprazole SODIUM 20 mg PO DAILY


   Apixaban [Eliquis] 5 mg PO BID


   Levothyroxine Sodium [Synthroid] 112 mcg PO DAILY


   Rosuvastatin [Crestor] 10 mg PO DAILY


   Furosemide [Lasix] 120 mg PO DAILY


   Empagliflozin/Metformin HCl [Synjardy Xr 25-1,000 mg Tablet] 1 tab PO BID


   Terazosin HCl 10 mg PO BID


   Fluticasone/Vilanterol [Breo Ellipta 200-25 Mcg INH] 1 puff INHALATION RT-

DAILY


   Candesartan Cilexetil [Atacand] 32 mg PO DAILY


   Bisoprolol Fumarate [Zebeta] 10 mg PO DAILY


   Spironolactone 25 mg PO DAILY


   Albuterol Sulfate [Ventolin HFA] 2 puff INHALATION RT-Q4H PRN #1 inhaler


     PRN Reason: Shortness Of Breath


Discharge Medication List





Apixaban [Eliquis] 5 mg PO BID 08/15/21 [History]


Bisoprolol Fumarate [Zebeta] 10 mg PO DAILY 08/15/21 [History]


Candesartan Cilexetil [Atacand] 32 mg PO DAILY 08/15/21 [History]


Empagliflozin/Metformin HCl [Synjardy Xr 25-1,000 mg Tablet] 1 tab PO BID 

08/15/21 [History]


Fluticasone/Vilanterol [Breo Ellipta 200-25 Mcg INH] 1 puff INHALATION RT-DAILY 

08/15/21 [History]


Furosemide [Lasix] 120 mg PO DAILY 08/15/21 [History]


Levothyroxine Sodium [Synthroid] 112 mcg PO DAILY 08/15/21 [History]


RABEprazole SODIUM 20 mg PO DAILY 08/15/21 [History]


Rosuvastatin [Crestor] 10 mg PO DAILY 08/15/21 [History]


Spironolactone 25 mg PO DAILY 08/15/21 [History]


Terazosin HCl 10 mg PO BID 08/15/21 [History]


Albuterol Sulfate [Ventolin HFA] 2 puff INHALATION RT-Q4H PRN #1 inhaler 

08/16/21 [Rx]


Ascorbic Acid [Vitamin C] 1,000 mg PO DAILY 30 Days #30 tab 08/16/21 [Rx]


Cholecalciferol [Vitamin D3 (25 Mcg = 1000 Iu)] 125 mcg PO DAILY 30 Days #30 

tablet 08/16/21 [Rx]


Melatonin 5 mg PO HS 30 Days #30 tablet 08/16/21 [Rx]


Zinc Sulfate [Orazinc] 220 mg PO DAILY 30 Days #30 cap 08/16/21 [Rx]


dexAMETHasone ORAL [Hexadrol] 6 mg PO DAILY 7 Days #21 tab 08/16/21 [Rx]








Follow up Appointment(s)/Referral(s): 


Luana Augustin MD [STAFF PHYSICIAN] - 1 Week


Bernard Cruz [STAFF PHYSICIAN] - 1-2 Days


Patient Instructions/Handouts:  Coronavirus Disease 2019 (COVID-19)


Activity/Diet/Wound Care/Special Instructions: 








Activity:





As tolerated





Diet: 





Heart healthy and carb consistent diet.





Special Instructions: 





Take medications as directed without missing any doses.





Please continue to use incentive spirometry.





Thank you for allowing us to participate in your care, it was a pleasure having 

you for our patient.